# Patient Record
Sex: FEMALE | Race: BLACK OR AFRICAN AMERICAN | Employment: FULL TIME | URBAN - METROPOLITAN AREA
[De-identification: names, ages, dates, MRNs, and addresses within clinical notes are randomized per-mention and may not be internally consistent; named-entity substitution may affect disease eponyms.]

---

## 2020-10-29 LAB
A/G RATIO: 1.1 (ref 1.1–2.2)
ALBUMIN SERPL-MCNC: 3.8 G/DL (ref 3.4–5)
ALP BLD-CCNC: 93 U/L (ref 40–129)
ALT SERPL-CCNC: 27 U/L (ref 10–40)
ANION GAP SERPL CALCULATED.3IONS-SCNC: 14 MMOL/L (ref 3–16)
AST SERPL-CCNC: 23 U/L (ref 15–37)
BASOPHILS ABSOLUTE: 0.1 K/UL (ref 0–0.2)
BASOPHILS RELATIVE PERCENT: 1.1 %
BILIRUB SERPL-MCNC: 0.3 MG/DL (ref 0–1)
BUN BLDV-MCNC: 4 MG/DL (ref 7–20)
CALCIUM SERPL-MCNC: 9.5 MG/DL (ref 8.3–10.6)
CHLORIDE BLD-SCNC: 104 MMOL/L (ref 99–110)
CO2: 19 MMOL/L (ref 21–32)
CREAT SERPL-MCNC: 0.6 MG/DL (ref 0.6–1.1)
EOSINOPHILS ABSOLUTE: 0.1 K/UL (ref 0–0.6)
EOSINOPHILS RELATIVE PERCENT: 1.5 %
GFR AFRICAN AMERICAN: >60
GFR NON-AFRICAN AMERICAN: >60
GLOBULIN: 3.5 G/DL
GLUCOSE BLD-MCNC: 116 MG/DL (ref 70–99)
GONADOTROPIN, CHORIONIC (HCG) QUANT: 1111 MIU/ML
HCT VFR BLD CALC: 39.4 % (ref 36–48)
HEMOGLOBIN: 13.2 G/DL (ref 12–16)
LIPASE: 13 U/L (ref 13–60)
LYMPHOCYTES ABSOLUTE: 3.5 K/UL (ref 1–5.1)
LYMPHOCYTES RELATIVE PERCENT: 41.9 %
MCH RBC QN AUTO: 31 PG (ref 26–34)
MCHC RBC AUTO-ENTMCNC: 33.5 G/DL (ref 31–36)
MCV RBC AUTO: 92.4 FL (ref 80–100)
MONOCYTES ABSOLUTE: 0.4 K/UL (ref 0–1.3)
MONOCYTES RELATIVE PERCENT: 5.1 %
NEUTROPHILS ABSOLUTE: 4.3 K/UL (ref 1.7–7.7)
NEUTROPHILS RELATIVE PERCENT: 50.4 %
PDW BLD-RTO: 15.8 % (ref 12.4–15.4)
PLATELET # BLD: 302 K/UL (ref 135–450)
PMV BLD AUTO: 9.1 FL (ref 5–10.5)
POTASSIUM SERPL-SCNC: 3.2 MMOL/L (ref 3.5–5.1)
RBC # BLD: 4.26 M/UL (ref 4–5.2)
SODIUM BLD-SCNC: 137 MMOL/L (ref 136–145)
TOTAL PROTEIN: 7.3 G/DL (ref 6.4–8.2)
WBC # BLD: 8.4 K/UL (ref 4–11)

## 2020-10-29 PROCEDURE — 84702 CHORIONIC GONADOTROPIN TEST: CPT

## 2020-10-29 PROCEDURE — 85025 COMPLETE CBC W/AUTO DIFF WBC: CPT

## 2020-10-29 PROCEDURE — 83690 ASSAY OF LIPASE: CPT

## 2020-10-29 PROCEDURE — 80053 COMPREHEN METABOLIC PANEL: CPT

## 2020-10-29 RX ORDER — FOLIC ACID 1 MG/1
1 TABLET ORAL DAILY
COMMUNITY

## 2020-10-29 RX ORDER — LABETALOL 100 MG/1
50 TABLET, FILM COATED ORAL 2 TIMES DAILY
COMMUNITY

## 2020-10-29 ASSESSMENT — PAIN SCALES - GENERAL: PAINLEVEL_OUTOF10: 10

## 2020-10-29 ASSESSMENT — PAIN DESCRIPTION - DESCRIPTORS: DESCRIPTORS: SORE;SHARP

## 2020-10-29 ASSESSMENT — PAIN DESCRIPTION - LOCATION: LOCATION: ABDOMEN

## 2020-10-29 ASSESSMENT — PAIN DESCRIPTION - PAIN TYPE: TYPE: ACUTE PAIN

## 2020-10-30 ENCOUNTER — APPOINTMENT (OUTPATIENT)
Dept: ULTRASOUND IMAGING | Age: 30
End: 2020-10-30
Payer: COMMERCIAL

## 2020-10-30 ENCOUNTER — HOSPITAL ENCOUNTER (EMERGENCY)
Age: 30
Discharge: HOME | End: 2020-10-30
Attending: EMERGENCY MEDICINE
Payer: COMMERCIAL

## 2020-10-30 VITALS
TEMPERATURE: 98.1 F | WEIGHT: 240 LBS | HEART RATE: 87 BPM | OXYGEN SATURATION: 99 % | HEIGHT: 62 IN | SYSTOLIC BLOOD PRESSURE: 165 MMHG | DIASTOLIC BLOOD PRESSURE: 101 MMHG | BODY MASS INDEX: 44.16 KG/M2 | RESPIRATION RATE: 18 BRPM

## 2020-10-30 LAB
BILIRUBIN URINE: ABNORMAL
BLOOD, URINE: NEGATIVE
CLARITY: ABNORMAL
COLOR: YELLOW
COMMENT UA: ABNORMAL
EPITHELIAL CELLS, UA: 4 /HPF (ref 0–5)
GLUCOSE URINE: NEGATIVE MG/DL
HCG(URINE) PREGNANCY TEST: POSITIVE
HYALINE CASTS: 13 /LPF (ref 0–8)
KETONES, URINE: ABNORMAL MG/DL
LEUKOCYTE ESTERASE, URINE: NEGATIVE
MICROSCOPIC EXAMINATION: YES
NITRITE, URINE: NEGATIVE
PH UA: 6.5 (ref 5–8)
PROTEIN UA: NEGATIVE MG/DL
RBC UA: 2 /HPF (ref 0–4)
RENAL EPITHELIAL, UA: ABNORMAL /HPF (ref 0–1)
SPECIFIC GRAVITY UA: 1.02 (ref 1–1.03)
URINE REFLEX TO CULTURE: ABNORMAL
URINE TYPE: ABNORMAL
UROBILINOGEN, URINE: 1 E.U./DL
WBC UA: 5 /HPF (ref 0–5)

## 2020-10-30 PROCEDURE — 99284 EMERGENCY DEPT VISIT MOD MDM: CPT

## 2020-10-30 PROCEDURE — 81001 URINALYSIS AUTO W/SCOPE: CPT

## 2020-10-30 PROCEDURE — 84703 CHORIONIC GONADOTROPIN ASSAY: CPT

## 2020-10-30 PROCEDURE — 76817 TRANSVAGINAL US OBSTETRIC: CPT

## 2020-10-30 ASSESSMENT — ENCOUNTER SYMPTOMS
DIARRHEA: 0
ABDOMINAL PAIN: 1
SHORTNESS OF BREATH: 0
CHEST TIGHTNESS: 0
NAUSEA: 0
VOMITING: 0

## 2020-10-30 NOTE — H&P
Department of Gynecology  Consult Note        CHIEF COMPLAINT:   Ectopic    History obtained from patient    HISTORY OF PRESENT ILLNESS:     The patient is a 31 y.o. female presents with chest and abdominal pain. Patient began feeling chest pain and numbness in arms. Mild pain across lower abdomen, no vaginal bleeding/discharge. Patient with positive UPT on monday    Past Medical History:        Diagnosis Date    Miscarriage     Psychiatric problem     Pyelonephritis complicating pregnancy 4/88/8696    Pyelonephritis complicating pregnancy 2/83/7736    Seizures ([address]     states had sz when went to [facility]     Past Surgical History:        Procedure Laterality Date    DILATION AND CURETTAGE OF UTERUS      WISDOM TOOTH EXTRACTION         Past Gynecological History:    1. Last menstrual period:  9/28  2. Menses: interval:  4 weeks  duration:  30 day(s)  3. Contraception: None  4. Sexually transmitted disease history: Chlamydia        A. Number of sexual partners in the last 6 months: 1    meds:No current facility-administered medications for this encounter.      Current Outpatient Medications:     labetalol (NORMODYNE) 100 MG tablet, Take 50 mg by mouth 2 times daily, Disp: , Rfl:     folic acid (FOLVITE) 1 MG tablet, Take 1 mg by mouth daily, Disp: , Rfl:     Prenatal MV-Min-Fe Fum-FA-DHA (PRENATAL 1 PO), Take 1 tablet by mouth daily, Disp: , Rfl:     methocarbamol (ROBAXIN) 750 MG tablet, Take 750 mg by mouth 4 times daily, Disp: , Rfl:     traMADol (ULTRAM) 50 MG tablet, Take 50 mg by mouth every 6 hours as needed for Pain, Disp: , Rfl:     norethindrone-ethinyl estradiol (JUNEL FE 1/20) 1-20 MG-MCG per tablet, Take 1 tablet by mouth daily, Disp: , Rfl:     ibuprofen (ADVIL;MOTRIN) 800 MG tablet, Take 1 tablet by mouth every 8 hours as needed for Pain or Fever, Disp: 20 tablet, Rfl: 0    ondansetron (ZOFRAN ODT) 4 MG disintegrating tablet, Take 1-2 tablets by mouth every 12 hours as needed for Nausea May Sub regular tablet (non-ODT) if insurance does not cover ODT., Disp: 12 tablet, Rfl: 0    naproxen (NAPROSYN) 500 MG tablet, Take 1 tablet by mouth 2 times daily for 20 doses. , Disp: 20 tablet, Rfl: 0       Allergies:  Sulfa antibiotics     Social History:  TOBACCO:  Stopped with +UPT  ETOH:   reports previous alcohol use. Family History:   History reviewed. No pertinent family history. PHYSICAL EXAM:    Vitals:  BP (!) 165/101   Pulse 87   Temp 98.1 °F (36.7 °C) (Oral)   Resp 18   Ht 5' 2\" (1.575 m)   Wt 240 lb (108.9 kg)   LMP 09/26/2020   SpO2 99%   BMI 43.90 kg/m²     CONSTITUTIONAL:  awake, alert, cooperative, no apparent distress, and appears stated age  LUNGS:  No increased work of breathing, good air exchange, clear to auscultation bilaterally, no crackles or wheezing  CARDIOVASCULAR:  Normal apical impulse, regular rate and rhythm, normal S1 and S2, no S3 or S4, and no murmur noted  ABDOMEN:  No scars, normal bowel sounds, soft, non-distended, non-tender, no masses palpated, no hepatosplenomegally      DATA:  Recent Labs     10/29/20  2328   WBC 8.4   HGB 13.2   HCT 39.4        Recent Labs     10/29/20  2328      K 3.2*      CO2 19*   BUN 4*   CREATININE 0.6   CALCIUM 9.5   AST 23   ALT 27       Labs:    CBC:   Lab Results   Component Value Date    WBC 8.4 10/29/2020    RBC 4.26 10/29/2020    HGB 13.2 10/29/2020    HCT 39.4 10/29/2020    MCV 92.4 10/29/2020    RDW 15.8 10/29/2020     10/29/2020       IMPRESSION/RECOMMENDATIONS:      HCG 1100 with US demonstrating small amount of free fluid. No IUP, no adnexa masses. Currently no abdominal pain. Patient desires pregnancy. Will repeat HCG in 48 hours, call GYN office today to have follow up on Monday. Reviewed precautions and to return if increased pain.

## 2020-10-30 NOTE — ED NOTES
Pt resting in bed with lights off, no s/s of distress. Pt is alert and orientated. Pt respirations easy, even, and unlabored. Pt abdomen soft and non distended.       Manning Gitelman, RN  10/30/20 8877

## 2020-10-30 NOTE — ED PROVIDER NOTES
[facility]        Pt Name: Lori Polo  MRN: [id number]  [place] 1990  Date of evaluation: 10/29/2020  Provider: CLEO Escamilla CNP  PCP: CLEO Pepper CNP     I have seen and evaluated this patient with my supervising physician Caleb Zarate       Chief Complaint   Patient presents with    Abdominal Pain     and vomiting x tonight. Pt @ 4 weeks pregnant - first appt 11/20 to do U/S. Denies any vaginal bleeding. HISTORY OF PRESENT ILLNESS   (Location, Timing/Onset, Context/Setting, Quality, Duration, Modifying Factors, Severity, Associated Signs and Symptoms)  Note limiting factors. Lori Polo is a 31 y.o. female presents to the emergency department with complaints of generalized abdominal pain and being 4 weeks 6 days pregnant. Reports that she will see OB on 1120 for first visit. 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 States that she is scheduled for cerclage between 12 and 14 weeks. Denies any headache, fever, lightheadedness, dizziness, visual disturbances. No chest pain or pressure. No neck or back pain. No shortness of breath, cough, or congestion. No vomiting, diarrhea, constipation, or dysuria. No rash. Nursing Notes were all reviewed and agreed with or any disagreements were addressed in the HPI. REVIEW OF SYSTEMS    (2-9 systems for level 4, 10 or more for level 5)     Review of Systems   Constitutional: Negative for activity change, chills and fever. Respiratory: Negative for chest tightness and shortness of breath. Cardiovascular: Negative for chest pain. Gastrointestinal: Positive for abdominal pain. Negative for diarrhea, nausea and vomiting. Genitourinary: Negative for dysuria. All other systems reviewed and are negative. Positives and Pertinent negatives as per HPI.   Except as noted above in the ROS, all other  Drug use: No       SCREENINGS             PHYSICAL EXAM    (up to 7 for level 4, 8 or more for level 5)     ED Triage Vitals [10/29/20 2315]   BP Temp Temp Source Pulse Resp SpO2 Height Weight   111/83 98.1 °F (36.7 °C) Oral 87 24 96 % 5' 2\" (1.575 m) 240 lb (108.9 kg)       Physical Exam  Vitals signs and nursing note reviewed. Constitutional:       Appearance: She is well-developed. She is not diaphoretic. HENT:      Head: Normocephalic and atraumatic. Right Ear: External ear normal.      Left Ear: External ear normal.   Eyes:      General:         Right eye: No discharge. Left eye: No discharge. Neck:      Musculoskeletal: Normal range of motion and neck supple. Vascular: No JVD. Cardiovascular:      Rate and Rhythm: Normal rate and regular rhythm. Pulses: Normal pulses. Heart sounds: Normal heart sounds. Pulmonary:      Effort: Pulmonary effort is normal. No respiratory distress. Breath sounds: Normal breath sounds. Abdominal:      Palpations: Abdomen is soft. Tenderness: There is no abdominal tenderness. Musculoskeletal: Normal range of motion. Skin:     General: Skin is warm and dry. Coloration: Skin is not pale. Neurological:      Mental Status: She is alert and oriented to person, place, and time.    Psychiatric:         Behavior: Behavior normal.         DIAGNOSTIC RESULTS   LABS:    Labs Reviewed   CBC WITH AUTO DIFFERENTIAL - Abnormal; Notable for the following components:       Result Value    RDW 15.8 (*)     All other components within normal limits    Narrative:     Performed at:  [facility] [address], [place], [facility] [address]   Phone [phone]   COMPREHENSIVE METABOLIC PANEL - Abnormal; Notable for the following components:    Potassium 3.2 (*)     CO2 19 (*)     Glucose 116 (*)     BUN 4 (*)     All other components within normal limits    Narrative:     Performed at:  [facility] also represent early IUP or failed early pregnancy. No results found. PROCEDURES   Unless otherwise noted below, none     Procedures    CRITICAL CARE TIME   N/A    CONSULTS:  IP CONSULT TO OB GYN      EMERGENCY DEPARTMENT COURSE and DIFFERENTIAL DIAGNOSIS/MDM:   Vitals:    Vitals:    10/29/20 2315 10/30/20 0300 10/30/20 0547   BP: 111/83 134/74 (!) 165/101   Pulse: 87  87   Resp: 24  18   Temp: 98.1 °F (36.7 °C)     TempSrc: Oral     SpO2: 96% 98% 99%   Weight: 240 lb (108.9 kg)     Height: 5' 2\" (1.575 m)         Patient was given the following medications:  Medications - No data to display        Briefly, this is a 31year old female that  presents to the emergency department with complaints of generalized abdominal pain and being 4 weeks 6 days pregnant. Reports that she will see OB on 1120 for first visit. 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 States that she is scheduled for cerclage between 12 and 14 weeks. She has no vaginal bleeding. Beta quantitative 1111. Labs are otherwise unremarkable. She is not having any vaginal bleeding.         US OB TRANSVAGINAL (Final result)   Result time 10/30/20 04:17:07   Final result by Rolin Olszewski, MD (10/30/20 04:17:07)                 Impression:     No IUP is seen.  Moderate volume free fluid in the setting of a positive beta   hCG does raise suspicion for ectopic pregnancy though no adnexal mass is   seen.  Finding may also represent early IUP or failed early pregnancy. Patient was dispositioned by attending physician as her visit did exceed the length of my shift. FINAL IMPRESSION      1.  Abdominal pain during pregnancy in first trimester          DISPOSITION/PLAN   DISPOSITION Decision To Discharge 10/30/2020 06:32:05 AM      PATIENT REFERREDTO:  Meryl Bangura MD  [address] [place] [address], [address] [place] [address] Road  [phone]    On 11/2/2020        DISCHARGE MEDICATIONS:  Discharge Medication List as of

## 2020-10-30 NOTE — ED NOTES
Bed: 19  Expected date:   Expected time:   Means of arrival:   Comments:  David HARVEY has [address] Ratna Canseco RN  10/30/20 9843

## 2020-10-30 NOTE — ED PROVIDER NOTES
[address]   Phone [phone]   PREGNANCY, URINE    Narrative:     Performed at:  [facility] [address] [facility],  [place], [address]   Phone [phone]   On exam pt is resting comfortably  VSS, non labored breaths    Concern for ectopic vs threatened miscarriage. Discussed with Dr. Brii Hall who evaluated patient, recommends repeat hcg on Sunday and then to follow up with her Ob gyn on Monday at [facility]. Pt given strict precautions to return if any worsening pain, bleeding or loss of fluids.        Herminio Lackey MD  10/30/20 5129

## 2020-10-30 NOTE — LETTER
[facility] Emergency Department  [address] [place] [address], [address]             October 30, 2020    Patient: Rhina Marrero   YOB: 1990   Date of Visit: 10/29/2020       To Whom It May Concern:    Rhina Marrero was seen and treated in our emergency department on 10/29/2020. She may return to work on  11/2/2020.       Sincerely,         [facility]

## 2020-10-30 NOTE — ED PROVIDER NOTES
This patient was seen by the Mid-Level Provider. I have seen and examined the patient, agree with the workup, evaluation, management and diagnosis. Care plan has been discussed. My assessment reveals a 79-year-old female who presents with abdominal pain. This is a 79-year-old female presents stating that she is pregnant is been having lower abdominal pain. The patient states she is about 4 weeks pregnant by dates. She denies any vaginal bleeding. Radiology results:    US OB TRANSVAGINAL   Final Result   No IUP is seen. Moderate volume free fluid in the setting of a positive beta   hCG does raise suspicion for ectopic pregnancy though no adnexal mass is   seen. Finding may also represent early IUP or failed early pregnancy.                LABS:    Labs Reviewed   CBC WITH AUTO DIFFERENTIAL - Abnormal; Notable for the following components:       Result Value    RDW 15.8 (*)     All other components within normal limits    Narrative:     Performed at:  [facility] [address], [place], [address]   Phone [phone]   COMPREHENSIVE METABOLIC PANEL - Abnormal; Notable for the following components:    Potassium 3.2 (*)     CO2 19 (*)     Glucose 116 (*)     BUN 4 (*)     All other components within normal limits    Narrative:     Performed at:  [facility] [address], [place], [facility] [address]   Phone [phone]   URINE RT REFLEX TO CULTURE - Abnormal; Notable for the following components:    Clarity, UA CLOUDY (*)     Bilirubin Urine SMALL (*)     Ketones, Urine TRACE (*)     All other components within normal limits    Narrative:     Performed at:  [facility] [address], [place], [address]   Phone [phone]   MICROSCOPIC URINALYSIS - Abnormal; Notable for the following components:    Hyaline Casts, UA 13 (*)     All other components within normal limits    Narrative:     Performed at:  [facility] [address] [facility]  [place], [address]   Phone [phone]   LIPASE    Narrative:     Performed at:  [facility] [address] [facility],  [place], [address]   Phone [phone]   HCG, QUANTITATIVE, PREGNANCY    Narrative:     Performed at:  [facility] [address],  [place], [address]   Phone [phone]   PREGNANCY, URINE    Narrative:     Performed at:  [facility] [address],  [place], [address]   Phone [phone]             Exam:    Well-nourished female no acute distress. She had some lower abdominal discomfort and pain to palpation. Medical decision makin-year-old female with a history of multiple miscarriages who presents with lower abdominal pain. The patient's Samia Pear was just above the thousand. A ultrasound was obtained that shows some free fluid but no evidence of an IUP raising the possibility of an ectopic pregnancy however, no adnexal masses were noted. I have contacted our OB/GYN, Dr. Krish Seay, who is graciously agreed to come down and evaluate the patient. The patient is in stable condition. FINAL IMPRESSION:    1.  Abdominal pain during pregnancy in first trimester           Kait Rivera MD  10/30/20 0657